# Patient Record
Sex: MALE | Race: WHITE | NOT HISPANIC OR LATINO | URBAN - METROPOLITAN AREA
[De-identification: names, ages, dates, MRNs, and addresses within clinical notes are randomized per-mention and may not be internally consistent; named-entity substitution may affect disease eponyms.]

---

## 2017-01-27 ENCOUNTER — IMPORTED ENCOUNTER (OUTPATIENT)
Dept: URBAN - METROPOLITAN AREA CLINIC 50 | Facility: CLINIC | Age: 82
End: 2017-01-27

## 2017-05-23 ENCOUNTER — IMPORTED ENCOUNTER (OUTPATIENT)
Dept: URBAN - METROPOLITAN AREA CLINIC 50 | Facility: CLINIC | Age: 82
End: 2017-05-23

## 2017-06-23 ENCOUNTER — IMPORTED ENCOUNTER (OUTPATIENT)
Dept: URBAN - METROPOLITAN AREA CLINIC 50 | Facility: CLINIC | Age: 82
End: 2017-06-23

## 2017-06-23 NOTE — PATIENT DISCUSSION
"""Annual Type 1 Diabetic eye exam with dilation. 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 Macular edema is not present in the right eye. Recommend annual dilated examinations. Patient instructed to call office immediately if sudden changes in vision occur. Emphasized importance of good blood glucose control. Summary of care provided to the physician managing the ongoing diabetes care. """

## 2017-09-01 ENCOUNTER — IMPORTED ENCOUNTER (OUTPATIENT)
Dept: URBAN - METROPOLITAN AREA CLINIC 50 | Facility: CLINIC | Age: 82
End: 2017-09-01

## 2017-10-23 NOTE — PROCEDURE NOTE: CLINICAL
PROCEDURE NOTE: Laser Peripheral Iridotomy OD. Diagnosis: Anatomic Narrow Angles. Anesthesia: Topical. Prior to treatment, risks/benefits/alternatives discussed including infection, loss of vision, hemorrhage, cataract, glaucoma, retinal tears or detachment. Contact lens was applied. YAG treatment power = 2.5 mJ. Number of applications = 37. Argon treatment power = * mW. Spot size = * um. Pulse duration = * ms. Number of applications = *. Patient tolerated procedure well. One drop Alphagan P given post laser. There were no complications. Post laser instructions given. Pred Forte or Lotemax prescribed for patient to take QID for five days. Javier Nguyen

## 2017-10-23 NOTE — PROCEDURE NOTE: SURGICAL
<p>Here for LPI OS today. </p><p>Pt is scheduled for next Monday LPI OD</p><p>TA prior to [email or web address] after LPI OS:</p>

## 2018-05-18 ENCOUNTER — IMPORTED ENCOUNTER (OUTPATIENT)
Dept: URBAN - METROPOLITAN AREA CLINIC 50 | Facility: CLINIC | Age: 83
End: 2018-05-18

## 2019-01-11 ENCOUNTER — IMPORTED ENCOUNTER (OUTPATIENT)
Dept: URBAN - METROPOLITAN AREA CLINIC 50 | Facility: CLINIC | Age: 84
End: 2019-01-11

## 2019-06-28 ENCOUNTER — IMPORTED ENCOUNTER (OUTPATIENT)
Dept: URBAN - METROPOLITAN AREA CLINIC 50 | Facility: CLINIC | Age: 84
End: 2019-06-28

## 2019-09-13 ENCOUNTER — IMPORTED ENCOUNTER (OUTPATIENT)
Dept: URBAN - METROPOLITAN AREA CLINIC 50 | Facility: CLINIC | Age: 84
End: 2019-09-13

## 2020-02-28 ENCOUNTER — IMPORTED ENCOUNTER (OUTPATIENT)
Dept: URBAN - METROPOLITAN AREA CLINIC 50 | Facility: CLINIC | Age: 85
End: 2020-02-28

## 2020-08-28 ENCOUNTER — IMPORTED ENCOUNTER (OUTPATIENT)
Dept: URBAN - METROPOLITAN AREA CLINIC 50 | Facility: CLINIC | Age: 85
End: 2020-08-28

## 2021-04-17 ASSESSMENT — VISUAL ACUITY
OD_PH: 20/50
OS_CC: 20/200
OD_CC: 20/30-2
OS_CC: 20/400
OD_CC: 20/40
OD_CC: J2
OS_CC: J2
OS_CC: 20/200-1
OD_BAT: 20/100
OD_CC: J1
OD_CC: 20/40
OD_CC: 20/40+2
OS_CC: J5@ 17 IN
OS_CC: J2
OD_CC: 20/60-
OD_CC: J2
OD_CC: 20/50-2
OS_CC: 20/200
OD_OTHER: 20/100. >20/400.
OS_CC: J1
OD_CC: J1
OS_CC: 20/200
OD_CC: J5@ 17 IN
OS_CC: 20/200
OD_CC: 20/40+2
OS_CC: J1
OS_CC: 20/200

## 2021-04-17 ASSESSMENT — TONOMETRY
OD_IOP_MMHG: 15
OD_IOP_MMHG: 15
OS_IOP_MMHG: 17
OD_IOP_MMHG: 18
OS_IOP_MMHG: 21
OS_IOP_MMHG: 15
OD_IOP_MMHG: 22
OD_IOP_MMHG: 15
OS_IOP_MMHG: 13
OS_IOP_MMHG: 16
OD_IOP_MMHG: 18
OD_IOP_MMHG: 16
OS_IOP_MMHG: 18
OS_IOP_MMHG: 15

## 2021-08-20 ENCOUNTER — PREPPED CHART (OUTPATIENT)
Dept: URBAN - METROPOLITAN AREA CLINIC 50 | Facility: CLINIC | Age: 86
End: 2021-08-20

## 2021-11-05 NOTE — PATIENT DISCUSSION
"""Follow dry ARMD without treatment. MVI/AREDS/Amsler. Patient to call if vision changes or distortion increases. Good diet/do not smoke. """ cup/spoon/self fed

## 2025-07-03 NOTE — PATIENT DISCUSSION
No Retinal holes, Tears or Detachments. [Care Plan reviewed and provided to patient/caregiver] : Care plan reviewed and provided to patient/caregiver [Understands and communicates without difficulty] : Patient/Caregiver understands and communicates without difficulty